# Patient Record
Sex: MALE | URBAN - METROPOLITAN AREA
[De-identification: names, ages, dates, MRNs, and addresses within clinical notes are randomized per-mention and may not be internally consistent; named-entity substitution may affect disease eponyms.]

---

## 2019-05-19 ENCOUNTER — HOSPITAL ENCOUNTER (INPATIENT)
Facility: CLINIC | Age: 39
LOS: 1 days | Discharge: HOME | DRG: 897 | End: 2019-05-20
Attending: EMERGENCY MEDICINE | Admitting: SURGERY

## 2019-05-19 ENCOUNTER — APPOINTMENT (OUTPATIENT)
Dept: GENERAL RADIOLOGY | Facility: CLINIC | Age: 39
DRG: 897 | End: 2019-05-19
Attending: EMERGENCY MEDICINE

## 2019-05-19 ENCOUNTER — APPOINTMENT (OUTPATIENT)
Dept: CT IMAGING | Facility: CLINIC | Age: 39
DRG: 897 | End: 2019-05-19
Attending: EMERGENCY MEDICINE

## 2019-05-19 DIAGNOSIS — F10.921 ALCOHOL INTOXICATION WITH DELIRIUM (H): ICD-10-CM

## 2019-05-19 DIAGNOSIS — R45.1 AGITATION: ICD-10-CM

## 2019-05-19 PROBLEM — R78.0 ELEVATED ETOH LEVEL: Status: ACTIVE | Noted: 2019-05-19

## 2019-05-19 LAB
ALBUMIN SERPL-MCNC: 3.4 G/DL (ref 3.4–5)
ALBUMIN SERPL-MCNC: 3.9 G/DL (ref 3.4–5)
ALBUMIN UR-MCNC: NEGATIVE MG/DL
ALP SERPL-CCNC: 111 U/L (ref 40–150)
ALP SERPL-CCNC: 94 U/L (ref 40–150)
ALT SERPL W P-5'-P-CCNC: 28 U/L (ref 0–70)
ALT SERPL W P-5'-P-CCNC: 29 U/L (ref 0–70)
AMPHETAMINES UR QL SCN: NEGATIVE
AMYLASE SERPL-CCNC: 45 U/L (ref 30–110)
ANION GAP SERPL CALCULATED.3IONS-SCNC: 12 MMOL/L (ref 3–14)
ANION GAP SERPL CALCULATED.3IONS-SCNC: 14 MMOL/L (ref 3–14)
APAP SERPL-MCNC: <2 MG/L (ref 10–20)
APPEARANCE UR: CLEAR
AST SERPL W P-5'-P-CCNC: 18 U/L (ref 0–45)
AST SERPL W P-5'-P-CCNC: 25 U/L (ref 0–45)
BARBITURATES UR QL: NEGATIVE
BASOPHILS # BLD AUTO: 0 10E9/L (ref 0–0.2)
BASOPHILS NFR BLD AUTO: 0 %
BENZODIAZ UR QL: NEGATIVE
BILIRUB SERPL-MCNC: 0.2 MG/DL (ref 0.2–1.3)
BILIRUB SERPL-MCNC: 0.5 MG/DL (ref 0.2–1.3)
BILIRUB UR QL STRIP: NEGATIVE
BUN SERPL-MCNC: 10 MG/DL (ref 7–30)
BUN SERPL-MCNC: 13 MG/DL (ref 7–30)
CA-I BLD-MCNC: 4.3 MG/DL (ref 4.4–5.2)
CALCIUM SERPL-MCNC: 7.7 MG/DL (ref 8.5–10.1)
CALCIUM SERPL-MCNC: 9.2 MG/DL (ref 8.5–10.1)
CANNABINOIDS UR QL SCN: NEGATIVE
CHLORIDE SERPL-SCNC: 107 MMOL/L (ref 94–109)
CHLORIDE SERPL-SCNC: 109 MMOL/L (ref 94–109)
CO2 SERPL-SCNC: 21 MMOL/L (ref 20–32)
CO2 SERPL-SCNC: 21 MMOL/L (ref 20–32)
COCAINE UR QL: POSITIVE
COLOR UR AUTO: ABNORMAL
CREAT SERPL-MCNC: 0.88 MG/DL (ref 0.66–1.25)
CREAT SERPL-MCNC: 1.21 MG/DL (ref 0.66–1.25)
DIFFERENTIAL METHOD BLD: ABNORMAL
EOSINOPHIL # BLD AUTO: 0.9 10E9/L (ref 0–0.7)
EOSINOPHIL NFR BLD AUTO: 6 %
ERYTHROCYTE [DISTWIDTH] IN BLOOD BY AUTOMATED COUNT: 13.6 % (ref 10–15)
ERYTHROCYTE [DISTWIDTH] IN BLOOD BY AUTOMATED COUNT: 13.6 % (ref 10–15)
ETHANOL SERPL-MCNC: 0.24 G/DL
GFR SERPL CREATININE-BSD FRML MDRD: 75 ML/MIN/{1.73_M2}
GFR SERPL CREATININE-BSD FRML MDRD: >90 ML/MIN/{1.73_M2}
GLUCOSE BLDC GLUCOMTR-MCNC: 108 MG/DL (ref 70–99)
GLUCOSE SERPL-MCNC: 112 MG/DL (ref 70–99)
GLUCOSE SERPL-MCNC: 115 MG/DL (ref 70–99)
GLUCOSE UR STRIP-MCNC: NEGATIVE MG/DL
HCT VFR BLD AUTO: 43.5 % (ref 40–53)
HCT VFR BLD AUTO: 47.2 % (ref 40–53)
HGB BLD-MCNC: 15.3 G/DL (ref 13.3–17.7)
HGB BLD-MCNC: 16.8 G/DL (ref 13.3–17.7)
HGB UR QL STRIP: NEGATIVE
INTERPRETATION ECG - MUSE: NORMAL
KETONES UR STRIP-MCNC: NEGATIVE MG/DL
LACTATE BLD-SCNC: 2.7 MMOL/L (ref 0.7–2)
LEUKOCYTE ESTERASE UR QL STRIP: NEGATIVE
LIPASE SERPL-CCNC: 94 U/L (ref 73–393)
LYMPHOCYTES # BLD AUTO: 7.1 10E9/L (ref 0.8–5.3)
LYMPHOCYTES NFR BLD AUTO: 47 %
MAGNESIUM SERPL-MCNC: 2.3 MG/DL (ref 1.6–2.3)
MCH RBC QN AUTO: 29.9 PG (ref 26.5–33)
MCH RBC QN AUTO: 30.5 PG (ref 26.5–33)
MCHC RBC AUTO-ENTMCNC: 35.2 G/DL (ref 31.5–36.5)
MCHC RBC AUTO-ENTMCNC: 35.6 G/DL (ref 31.5–36.5)
MCV RBC AUTO: 85 FL (ref 78–100)
MCV RBC AUTO: 86 FL (ref 78–100)
MONOCYTES # BLD AUTO: 0.5 10E9/L (ref 0–1.3)
MONOCYTES NFR BLD AUTO: 3 %
MRSA DNA SPEC QL NAA+PROBE: NEGATIVE
NEUTROPHILS # BLD AUTO: 6.7 10E9/L (ref 1.6–8.3)
NEUTROPHILS NFR BLD AUTO: 44 %
NITRATE UR QL: NEGATIVE
OPIATES UR QL SCN: NEGATIVE
PCP UR QL SCN: NEGATIVE
PH UR STRIP: 5.5 PH (ref 5–7)
PHOSPHATE SERPL-MCNC: 3.4 MG/DL (ref 2.5–4.5)
PLATELET # BLD AUTO: 291 10E9/L (ref 150–450)
PLATELET # BLD AUTO: 339 10E9/L (ref 150–450)
PLATELET # BLD EST: ABNORMAL 10*3/UL
POTASSIUM SERPL-SCNC: 3.4 MMOL/L (ref 3.4–5.3)
POTASSIUM SERPL-SCNC: 3.9 MMOL/L (ref 3.4–5.3)
PROT SERPL-MCNC: 7 G/DL (ref 6.8–8.8)
PROT SERPL-MCNC: 8.3 G/DL (ref 6.8–8.8)
RBC # BLD AUTO: 5.11 10E12/L (ref 4.4–5.9)
RBC # BLD AUTO: 5.51 10E12/L (ref 4.4–5.9)
RBC MORPH BLD: ABNORMAL
SALICYLATES SERPL-MCNC: 3 MG/DL
SODIUM SERPL-SCNC: 142 MMOL/L (ref 133–144)
SODIUM SERPL-SCNC: 142 MMOL/L (ref 133–144)
SOURCE: ABNORMAL
SP GR UR STRIP: 1 (ref 1–1.03)
SPECIMEN SOURCE: NORMAL
UROBILINOGEN UR STRIP-MCNC: NORMAL MG/DL (ref 0–2)
WBC # BLD AUTO: 11.2 10E9/L (ref 4–11)
WBC # BLD AUTO: 15.2 10E9/L (ref 4–11)

## 2019-05-19 PROCEDURE — 80320 DRUG SCREEN QUANTALCOHOLS: CPT | Performed by: EMERGENCY MEDICINE

## 2019-05-19 PROCEDURE — 40000275 ZZH STATISTIC RCP TIME EA 10 MIN

## 2019-05-19 PROCEDURE — 70450 CT HEAD/BRAIN W/O DYE: CPT

## 2019-05-19 PROCEDURE — 96365 THER/PROPH/DIAG IV INF INIT: CPT

## 2019-05-19 PROCEDURE — 25000132 ZZH RX MED GY IP 250 OP 250 PS 637: Performed by: INTERNAL MEDICINE

## 2019-05-19 PROCEDURE — 80053 COMPREHEN METABOLIC PANEL: CPT | Performed by: EMERGENCY MEDICINE

## 2019-05-19 PROCEDURE — 99292 CRITICAL CARE ADDL 30 MIN: CPT

## 2019-05-19 PROCEDURE — 25000132 ZZH RX MED GY IP 250 OP 250 PS 637: Performed by: SURGERY

## 2019-05-19 PROCEDURE — 80329 ANALGESICS NON-OPIOID 1 OR 2: CPT | Performed by: EMERGENCY MEDICINE

## 2019-05-19 PROCEDURE — 25000128 H RX IP 250 OP 636: Performed by: EMERGENCY MEDICINE

## 2019-05-19 PROCEDURE — 25000125 ZZHC RX 250: Performed by: SURGERY

## 2019-05-19 PROCEDURE — 83735 ASSAY OF MAGNESIUM: CPT | Performed by: SURGERY

## 2019-05-19 PROCEDURE — 40000986 XR CHEST PORT 1 VW

## 2019-05-19 PROCEDURE — 94002 VENT MGMT INPAT INIT DAY: CPT

## 2019-05-19 PROCEDURE — 25800030 ZZH RX IP 258 OP 636: Performed by: SURGERY

## 2019-05-19 PROCEDURE — 82150 ASSAY OF AMYLASE: CPT | Performed by: SURGERY

## 2019-05-19 PROCEDURE — 87641 MR-STAPH DNA AMP PROBE: CPT | Performed by: SURGERY

## 2019-05-19 PROCEDURE — 96375 TX/PRO/DX INJ NEW DRUG ADDON: CPT

## 2019-05-19 PROCEDURE — 31500 INSERT EMERGENCY AIRWAY: CPT

## 2019-05-19 PROCEDURE — 84100 ASSAY OF PHOSPHORUS: CPT | Performed by: SURGERY

## 2019-05-19 PROCEDURE — 36415 COLL VENOUS BLD VENIPUNCTURE: CPT | Performed by: SURGERY

## 2019-05-19 PROCEDURE — 5A1935Z RESPIRATORY VENTILATION, LESS THAN 24 CONSECUTIVE HOURS: ICD-10-PCS | Performed by: EMERGENCY MEDICINE

## 2019-05-19 PROCEDURE — 99291 CRITICAL CARE FIRST HOUR: CPT | Mod: 25

## 2019-05-19 PROCEDURE — 40000281 ZZH STATISTIC TRANSPORT TIME EA 15 MIN

## 2019-05-19 PROCEDURE — 83605 ASSAY OF LACTIC ACID: CPT | Performed by: SURGERY

## 2019-05-19 PROCEDURE — 96376 TX/PRO/DX INJ SAME DRUG ADON: CPT

## 2019-05-19 PROCEDURE — 99291 CRITICAL CARE FIRST HOUR: CPT | Performed by: SURGERY

## 2019-05-19 PROCEDURE — 00000146 ZZHCL STATISTIC GLUCOSE BY METER IP

## 2019-05-19 PROCEDURE — 85027 COMPLETE CBC AUTOMATED: CPT | Performed by: SURGERY

## 2019-05-19 PROCEDURE — 87640 STAPH A DNA AMP PROBE: CPT | Performed by: SURGERY

## 2019-05-19 PROCEDURE — 80307 DRUG TEST PRSMV CHEM ANLYZR: CPT | Performed by: EMERGENCY MEDICINE

## 2019-05-19 PROCEDURE — 83690 ASSAY OF LIPASE: CPT | Performed by: SURGERY

## 2019-05-19 PROCEDURE — 81003 URINALYSIS AUTO W/O SCOPE: CPT | Performed by: EMERGENCY MEDICINE

## 2019-05-19 PROCEDURE — 25000128 H RX IP 250 OP 636

## 2019-05-19 PROCEDURE — 85025 COMPLETE CBC W/AUTO DIFF WBC: CPT | Performed by: EMERGENCY MEDICINE

## 2019-05-19 PROCEDURE — 40000008 ZZH STATISTIC AIRWAY CARE

## 2019-05-19 PROCEDURE — 25000125 ZZHC RX 250: Performed by: EMERGENCY MEDICINE

## 2019-05-19 PROCEDURE — 20000003 ZZH R&B ICU

## 2019-05-19 PROCEDURE — 82330 ASSAY OF CALCIUM: CPT | Performed by: SURGERY

## 2019-05-19 PROCEDURE — 96366 THER/PROPH/DIAG IV INF ADDON: CPT

## 2019-05-19 PROCEDURE — 80053 COMPREHEN METABOLIC PANEL: CPT | Performed by: SURGERY

## 2019-05-19 PROCEDURE — 93005 ELECTROCARDIOGRAM TRACING: CPT

## 2019-05-19 RX ORDER — PROPOFOL 10 MG/ML
INJECTION, EMULSION INTRAVENOUS
Status: COMPLETED
Start: 2019-05-19 | End: 2019-05-19

## 2019-05-19 RX ORDER — ETOMIDATE 2 MG/ML
30 INJECTION INTRAVENOUS ONCE
Status: COMPLETED | OUTPATIENT
Start: 2019-05-19 | End: 2019-05-19

## 2019-05-19 RX ORDER — NALOXONE HYDROCHLORIDE 0.4 MG/ML
.1-.4 INJECTION, SOLUTION INTRAMUSCULAR; INTRAVENOUS; SUBCUTANEOUS
Status: DISCONTINUED | OUTPATIENT
Start: 2019-05-19 | End: 2019-05-20 | Stop reason: HOSPADM

## 2019-05-19 RX ORDER — FENTANYL CITRATE 50 UG/ML
INJECTION, SOLUTION INTRAMUSCULAR; INTRAVENOUS
Status: COMPLETED
Start: 2019-05-19 | End: 2019-05-19

## 2019-05-19 RX ORDER — CHLORHEXIDINE GLUCONATE ORAL RINSE 1.2 MG/ML
15 SOLUTION DENTAL EVERY 12 HOURS
Status: DISCONTINUED | OUTPATIENT
Start: 2019-05-19 | End: 2019-05-19

## 2019-05-19 RX ORDER — DEXMEDETOMIDINE HYDROCHLORIDE 4 UG/ML
0.2-0.7 INJECTION, SOLUTION INTRAVENOUS CONTINUOUS
Status: DISCONTINUED | OUTPATIENT
Start: 2019-05-19 | End: 2019-05-20 | Stop reason: HOSPADM

## 2019-05-19 RX ORDER — PROPOFOL 10 MG/ML
10-20 INJECTION, EMULSION INTRAVENOUS EVERY 30 MIN PRN
Status: DISCONTINUED | OUTPATIENT
Start: 2019-05-19 | End: 2019-05-19

## 2019-05-19 RX ORDER — GABAPENTIN 250 MG/5ML
1200 SOLUTION ORAL ONCE
Status: COMPLETED | OUTPATIENT
Start: 2019-05-19 | End: 2019-05-19

## 2019-05-19 RX ORDER — NALOXONE HYDROCHLORIDE 0.4 MG/ML
.1-.4 INJECTION, SOLUTION INTRAMUSCULAR; INTRAVENOUS; SUBCUTANEOUS
Status: DISCONTINUED | OUTPATIENT
Start: 2019-05-19 | End: 2019-05-19

## 2019-05-19 RX ORDER — FENTANYL CITRATE 50 UG/ML
50 INJECTION, SOLUTION INTRAMUSCULAR; INTRAVENOUS ONCE
Status: COMPLETED | OUTPATIENT
Start: 2019-05-19 | End: 2019-05-19

## 2019-05-19 RX ORDER — FENTANYL CITRATE 50 UG/ML
INJECTION, SOLUTION INTRAMUSCULAR; INTRAVENOUS
Status: DISCONTINUED
Start: 2019-05-19 | End: 2019-05-19

## 2019-05-19 RX ORDER — PROPOFOL 10 MG/ML
5-75 INJECTION, EMULSION INTRAVENOUS CONTINUOUS
Status: DISCONTINUED | OUTPATIENT
Start: 2019-05-19 | End: 2019-05-19

## 2019-05-19 RX ORDER — GABAPENTIN 250 MG/5ML
600 SOLUTION ORAL 2 TIMES DAILY
Status: DISCONTINUED | OUTPATIENT
Start: 2019-05-19 | End: 2019-05-20 | Stop reason: HOSPADM

## 2019-05-19 RX ADMIN — FENTANYL CITRATE 50 MCG: 50 INJECTION, SOLUTION INTRAMUSCULAR; INTRAVENOUS at 04:58

## 2019-05-19 RX ADMIN — FENTANYL CITRATE: 50 INJECTION, SOLUTION INTRAMUSCULAR; INTRAVENOUS at 03:59

## 2019-05-19 RX ADMIN — VALPROIC ACID 500 MG: 250 SOLUTION ORAL at 09:04

## 2019-05-19 RX ADMIN — PROPOFOL 5 MCG/KG/MIN: 10 INJECTION, EMULSION INTRAVENOUS at 03:52

## 2019-05-19 RX ADMIN — DEXMEDETOMIDINE 0.2 MCG/KG/HR: 100 INJECTION, SOLUTION, CONCENTRATE INTRAVENOUS at 09:04

## 2019-05-19 RX ADMIN — GABAPENTIN 1200 MG: 250 SUSPENSION ORAL at 12:22

## 2019-05-19 RX ADMIN — FENTANYL CITRATE 50 MCG: 50 INJECTION, SOLUTION INTRAMUSCULAR; INTRAVENOUS at 03:51

## 2019-05-19 RX ADMIN — PROPOFOL 40 MCG/KG/MIN: 10 INJECTION, EMULSION INTRAVENOUS at 07:28

## 2019-05-19 RX ADMIN — CHLORHEXIDINE GLUCONATE 15 ML: 1.2 RINSE ORAL at 07:23

## 2019-05-19 RX ADMIN — ROCURONIUM BROMIDE 75 MG: 10 INJECTION, SOLUTION INTRAVENOUS at 03:51

## 2019-05-19 RX ADMIN — ETOMIDATE 30 MG: 2 INJECTION, SOLUTION INTRAVENOUS at 03:48

## 2019-05-19 ASSESSMENT — ACTIVITIES OF DAILY LIVING (ADL)
SWALLOWING: 0-->SWALLOWS FOODS/LIQUIDS WITHOUT DIFFICULTY
ADLS_ACUITY_SCORE: 15
COGNITION: 0 - NO COGNITION ISSUES REPORTED
RETIRED_EATING: 0-->INDEPENDENT
ADLS_ACUITY_SCORE: 20
AMBULATION: 0-->INDEPENDENT
TRANSFERRING: 0-->INDEPENDENT
ADLS_ACUITY_SCORE: 15
BATHING: 0-->INDEPENDENT
DRESS: 0-->INDEPENDENT
TOILETING: 0-->INDEPENDENT
ADLS_ACUITY_SCORE: 20
NUMBER_OF_TIMES_PATIENT_HAS_FALLEN_WITHIN_LAST_SIX_MONTHS: 0
RETIRED_COMMUNICATION: 0-->UNDERSTANDS/COMMUNICATES WITHOUT DIFFICULTY
FALL_HISTORY_WITHIN_LAST_SIX_MONTHS: NO

## 2019-05-19 NOTE — PROGRESS NOTES
[place] - Suicide Severity Rating Scale Completed? No, secondary to mechanical ventilation and pt unresponsiveness/sedation  If yes, what color did the patient score? N/A

## 2019-05-19 NOTE — ED NOTES
Pt continued with snoring respirations and large amounts of secretions; SPO2 88-90% on 10L NRB with nasal airway. Pt intubated with 30mg Etomidate IV, 75mg Rocuronium IV; 8.0 ETT 26@ lip by Dr. Cox.

## 2019-05-19 NOTE — PROGRESS NOTES
"ICU Staff:    Deonna John is a 38 year old man admitted to the ICU for vent support required due to ETOH overdose and possible other overdose; cocaine present of the drug screen. The patient required intubation in the [facility] ED. The patient was found to have an altered mental state and he was found outside supine on the ground outside of [facility]. The patient was \"very tense and combative\" en route patient received 400 mcg Ketamine.     MEDICATIONS:  Current Facility-Administered Medications   Medication     chlorhexidine (PERIDEX) 0.12 % solution 15 mL     dexmedetomidine (PRECEDEX) 400 mcg in 0.9% sodium chloride 100 mL     naloxone (NARCAN) injection 0.1-0.4 mg     naloxone (NARCAN) injection 0.1-0.4 mg     propofol (DIPRIVAN) infusion    And     propofol (DIPRIVAN) infusion 10-20 mg     propofol (DIPRIVAN) infusion     propofol (DIPRIVAN) infusion     ALLERGIES:  No Known Allergies     SOCIAL HISTORY:  Social History     Socioeconomic History     Marital status: Not on file     Spouse name: Not on file     Number of children: Not on file     Years of education: Not on file     Highest education level: Not on file   Occupational History     Not on file   Social Needs     Financial resource strain: Not on file     Food insecurity:     Worry: Not on file     Inability: Not on file     Transportation needs:     Medical: Not on file     Non-medical: Not on file   Tobacco Use     Smoking status: Not on file   Substance and Sexual Activity     Alcohol use: Not on file     Drug use: Not on file     Sexual activity: Not on file   Lifestyle     Physical activity:     Days per week: Not on file     Minutes per session: Not on file     Stress: Not on file   Relationships     Social connections:     Talks on phone: Not on file     Gets together: Not on file     Attends [employer] service: Not on file     Active member of club or organization: Not on file     Attends meetings of clubs or organizations: " Not on file     Relationship status: Not on file     Intimate partner violence:     Fear of current or ex partner: Not on file     Emotionally abused: Not on file     Physically abused: Not on file     Forced sexual activity: Not on file   Other Topics Concern     Not on file   Social History Narrative     Not on file      PHYSICAL EXAM:  Vital Signs: /75   Pulse 84   Temp 96.4  F (35.8  C)   Resp 15   Wt 75 kg (165 lb 5.5 oz)   SpO2 99%   HEENT: pupils 2 mm bilaterally and reactive.   Chest: CTA bilaterally,   Cor: RRR no murmur   Abd: soft and no masses   EXT: warm and well perfused. No edema.    Imaging:    CT Head: normal.    CXR: ET tube is 3 cm above the pepe    A/P: The patient is critically ill due to an overdose. Will continue to support with full vent and sedation. Will provide Precedex which may permit extubation on Precedex.     Neuro: ETOH overdose will follow the Neuro exam and extubate when possible. Will sedate with Propofol and Precedex to permit immediate sedation and then wean off the propofol to extubate on Precedex.    Cardiac: no acut ei issues. Will keep on the monitor and maintain electrolytes.   Pulm: no acute process. Will keep on the sat monitor and maintain full vent support and will check the ABG.   GI: NPO for now. OG suction.   : Ureña catheter to check for adequate UO.   ID: no acute issues.   ENDO: no acute issues.     The patient is critically ill due to ETOH overdose and require full vent support. 35 min of Critical Care time to examine the patient to review the medical records and to coordinate e the patient's ICU care.     Alvaro Pedersen MD, PhD

## 2019-05-19 NOTE — PROGRESS NOTES
ICU note    Patient admitted earlier this morning.  Tolerated propofol decrease from an agitation standpoint he did have some dyssynchrony with vent and gagging behavioral issues.    /72   Pulse 87   Temp 97  F (36.1  C)   Resp 17   Wt 75 kg (165 lb 5.5 oz)   SpO2 98%   Pupils equal round reactive  Chest clear  Cardiovascular exam without murmurs or gallops.  Good peripheral pulses.  Abdomen soft  He is sedated, nonfocal neuro exam without abnormal movements or tremor    As his alcohol wears off we will assess for extubation.    Plan  Start Precedex.  Wean propofol off.  Valproic acid and gabapentin given    Wean sedation and extubate.  Given that he did not have a primary respiratory diagnosis or any respiratory complications he can most likely be extubated without a pressure support trial.    30 minutes of critical care time thus far today.  This is in addition to previously documented 35 minutes.  This brings his total for today to 65 minutes of critical care time.    Marco Burgess MD  ICU

## 2019-05-19 NOTE — PROGRESS NOTES
Patient self extubated at 0935. Patient is on a 6L oxymask with SpO2 in the upper 90's. No stridor present.  Will cont to monitor.  5/19/2019  Nisa Phelps RRT

## 2019-05-19 NOTE — PLAN OF CARE
"Patient improving. Extubated self. Doing well on RA. Confused to situation of what happened overnight and how he arrived in the hospital/ICU. Patient was demanding to go home and have IVs taken out. Dr. Walter stated he would see how the afternoon went, and if able to walk around the unit and tolerate a meal, then he could be discharged. However, patient was unable to get up and has been sleeping most of the afternoon.  The hope is that the patient will stay overnight and discharge in the AM.    Neuro: Lethargic. Opens eyes spontaneously. Follows commands. Tearful about situation. Refused to allow providers to contact family or friends to update on hospitilization (no contact in Epic). Patient reported that \"no one cares about me, and they probably wish I was dead anyways.\"      CV: SR/ST. Good BP. Good pulses.     Lungs: RA. Diminished/clear.    GI: Regular diet-patient did not eat.    : montalvo with good UO.    Skin: Intact.    PIV x2. SL.  "

## 2019-05-19 NOTE — ED TRIAGE NOTES
"Pt was found on sidewalk at the [facility] where he was estimated to have been lying for approx 30 min. Pt was screaming \"I want to die\" and \"I want to pee\" at EMS and PD. Pt very aggressive with EMS, given 400mg IM Ketamine. Pt sedated and not rousing to verbal or tactile stimulation upon arrival to ED, snoring respirations, large amounts of clear, thick secretions.   "

## 2019-05-19 NOTE — PLAN OF CARE
Pt admitted to ICU w/AMS 2/2 intoxication. Remains intubated 2/2 respiratory suppression post ketamine administration, and sedated on propofol. VSS. PERRL. Unresponsive at this time to stimuli. Will await for pt to awake and undergo SBT. Precedex also available if needed during extubation trial. Will continue to monitor.

## 2019-05-19 NOTE — ED NOTES
Bed: [facility]  Expected date: 5/19/19  Expected time: 3:24 AM  Means of arrival: Ambulance  Comments:  Veronica 535 38M delirious; ketamine

## 2019-05-19 NOTE — PROGRESS NOTES
Patient self extubated after propofol sedation was turned off, per request of MD. Patient was restrained and on Precedex. Patient began fighting the vent, arouse and became agitated to the point of the MD and bedside RN attempting to hold arms and hands away from vent. Patient placed on 6L Oxymask. No stridor and O2Sats 100%.

## 2019-05-19 NOTE — ED NOTES
No need for Health Officer Hold in ED, per MD; pt was transported to ED on PO via [facility].

## 2019-05-19 NOTE — ED PROVIDER NOTES
"  History     Chief Complaint:  Altered Mental Status    The history is provided by the EMS personnel. The history is limited by the condition of the patient.      Deonna John is a 38 year old male who presents via EMS with altered mental state. Patient was found outside supine on the ground outside of [facility]; medics report it is unknown how long he was outside. Patient was screaming \"I want to die and I need to pee\" over and over again once PD approached. En route, patient was secreting significantly and was very tense and combative. Patient hypertensive at 199/129 with HR at 130 bpm sating at 82% after 400 mcg of Ketamine was given. This increased to 90% on 3L.    Allergies:  No Known Drug Allergies    Medications:    Medications reviewed. No current medications.     Past Medical History:    Medical history reviewed. No pertinent medical history.    Past Surgical History:    Surgical history reviewed. No pertinent surgical history.    Family History:    Family history reviewed. No pertinent family history.     Social History:  Presents via EMS      Review of Systems   Unable to perform ROS: Mental status change     Physical Exam   Patient Vitals for the past 24 hrs:   BP Heart Rate Resp SpO2 Weight   05/19/19 0332 112/74 125 8 90 % 74.4 kg (164 lb 0.4 oz)     Physical Exam  Head: No signs of trauma.   Mouth/Throat: Oropharynx with very thick secretions   Eyes: Conjunctivae are normal.   Neck: Normal range of motion. No cervical adenopathy  CV: tachycardic and regular rhythm.    Resp: Coarse upper airway sounds.  GI: Soft. There is no apparent tenderness.  No rebound or guarding.  Normal bowel sounds.    MSK: Normal range of motion. No bony deformities.    Neuro: The patient is non responsive to verbal or painful stimuli   Skin: Skin is cool throughout.        Emergency Department Course     ECG:  ECG taken at 0417, ECG read at 0419  Normal sinus rhythm  Normal ECG  Rate 83 bpm. KS interval 168 ms. QRS " "duration 84 ms. QT/QTc 380/446 ms. P-R-T axes 76 83 50.    Imaging:  Head CT w/o contrast  No acute abnormality.    LUIS FERNANDO SCHMIDT MD  Reading per radiology    Chest  XR, 1 view PORTABLE  ET tube 3.5 cm above the pepe.  Reading per radiology    Laboratory:  CBC: WBC 15.2 (H), HGB 16.8,   CMP: glucose 115 (H) o/w WNL (Creatinine 1.21)  Salicylate level: 3  Acetaminophen level: <2  UA: specific gravity 1.002 (L) o/w WNL  UDS: Cocaine positive o/w negative  PARAMJIT: 0.24 (H)    Procedures:  [facility] Intubation Procedure Note   Date/Time: 3:33 AM  Performed by: Lennox Cox MD    The procedure was performed in an emergent situation.  Patient identity confirmed: verbally with patient and arm band  Time out: Immediately prior to procedure a \"time out\" was called to verify the correct patient, procedure, equipment, support staff and site/side marked as required.    Indication: Airway protection    [facility]  Patient status: Unconscious  Preoxygenation: Mask  Pretreatment medications: None  Sedatives: Etomidate  Paralytic: 75 mg rocuronium  Laryngoscope size: Mac 4  Tube size: 8.0 cuffed with cuff inflated after placement  Number of attempts: 1  Cricoid pressure: yes  Cords visualized: yes  Post-procedure assessment: Breath sounds equal bilaterally with chest rise and absent over the epigastrium, Chest x-ray interpreted by me demonstrating endotracheal tube in appropriate position and CO2 detector.  Tube secured with: ETT blair    Patient tolerated the procedure well with no immediate complications.  Complications:  None    Interventions:  0346: Fentanyl 50 mcg IV  0348: Etomidate 30 mg IV  0351: Rocuronium 75 mg IV  0351: Fentanyl 50 mcg IV  0352: Propofol 5 mcg/kg/min IV   0359: Fentanyl 50 mcg IV  0416: Rate/Dose change: Propofol 20 mcg/kg/min IV   0449: Rate/Dose change: Propofol 40 mcg/kg/min IV   0458: Rate/Dose change: Propofol 50 mcg/kg/min IV     Emergency Department " Course:  0327 Nursing notes and vitals reviewed. I performed an exam of the patient as documented above. Plan for intubation. RT at bedside.   0335 I intubated the patient as detailed above.   0344 XR at bedside.  0412 The patient was sent for a CT while in the emergency department, results above.   0417 EKG obtained in the ED, see results above.   0509 I spoke with Dr. Pedersen of ICU regarding patient's presentation, findings, and plan of care who accepted admission.     Impression & Plan      Medical Decision Making:  Deonna John is a 38 year old male who presents to the emergency department today for evaluation of agitation.  Apparently he was found minimally responsive by the [facility] but then with stimulation became quite agitated and somewhat violent requiring restraints.  Patient ultimately received ketamine with EMS.  In presentation to the ER, he did not respond to verbal or painful stimuli.  He had very thick secretions and some coarse upper airway sounds.  Given the patient's secretions and his reported significant agitation and potential danger to himself or others in that state, it was decided to intubate the patient for airway protection.  This was accomplished without difficulty.  Work-up was pursued which showed an elevated alcohol level along with positive cocaine on drug screen.  The remainder of his work-up was non-concerning.  Patient was admitted to the intensive care unit with the plan for continued monitoring as he metabolizes the alcohol and further assessment.    Diagnosis:    ICD-10-CM   1. Alcohol intoxication with delirium (H) F10.921   2. Agitation R45.1     Disposition: ICU    Critical Care time was 30 minutes for this patient excluding procedures.     Scribe Disclosure:  Jayy COOPER, am serving as a scribe at 3:30 AM on 5/19/2019 to document services personally performed by Lennox Cox MD based on my observations and the provider's statements to me.     [place]G[place]  [facility] [facility] [place]  05/19/19 0706

## 2019-05-19 NOTE — ED NOTES
Pt to CT and back with 2 RNs and RT on cardiac monitor. Pt tolerated well. Placed under barney hugger.

## 2019-05-20 VITALS
DIASTOLIC BLOOD PRESSURE: 75 MMHG | HEART RATE: 78 BPM | WEIGHT: 166.01 LBS | SYSTOLIC BLOOD PRESSURE: 123 MMHG | TEMPERATURE: 98.6 F | RESPIRATION RATE: 37 BRPM | OXYGEN SATURATION: 97 %

## 2019-05-20 PROCEDURE — 99239 HOSP IP/OBS DSCHRG MGMT >30: CPT | Performed by: NURSE PRACTITIONER

## 2019-05-20 ASSESSMENT — ACTIVITIES OF DAILY LIVING (ADL)
ADLS_ACUITY_SCORE: 15

## 2019-05-20 NOTE — PROGRESS NOTES
Pt disoriented to situation. VSS on RA. Sats high 90s. Slept overnight/lethargic, awake early this am and asking when will be going home. Pt hopeful to go home, before the job interview at 12 today. Adequate urine output per montalvo. Will cont to closely monitor.

## 2019-05-20 NOTE — DISCHARGE SUMMARY
Pt's katia and Richard are dc'd and pt has walked in [place] w stand by assist.  Discharged via cab w volunteer walking him down to cab.  Pt has phone w hium and clothing that wasn't cut up in ED.  See discharge instructions.

## 2019-05-20 NOTE — DISCHARGE SUMMARY
[facility]  Discharge Summary      Date of Admission: 5/19/2019  Date of Discharge: 5/20/2019  Discharging Provider: Iliana Torres NP  Date of Service (when I saw the patient): 5/20/19    Primary Provider: No primary care provider on file  Primary Care clinic: No primary provider on file  Phone: None  Fax number: None     Discharge Diagnoses   Alcohol intoxication  Drug screen positive for cocaine    Hospital Course   Mr. John is a 38 year old male admitted to the hospital early morning on 5/19/19 after being found on the ground outside with altered mental status. EMS reported it was unclear how long he was outside, he was combative, and tense. In the ED he had snoring respirations with large amounts of secretions and oxygen saturations in the high 80s. He was intubated and admitted to the IDU. His BAL was 0.24 and urine drug screen positive for cocaine.     His mental status improved and he extubated himself late morning on 5/19/19. He was quickly weaned to room air and allowed to eat and ambulate. The morning of 5/20/19 he says he does not remember the events leading to his hospital presentation, and he would like to discharge home. He denies any thoughts of harming himself. He was discharged home via cab. He declined the offer to contact family to notify them of his admission.    Code Status   Full Code    SUBJECTIVE: No complaints this morning. Denies pain, chest pain, shortness of breath, numbness/tingling.     General: Awake, alert  HEENT: Mucous membranes moist  Pulm: Clear to auscultation, no work of breathing  Cardiac: S1/S2, no m/r/g; pedal pulses 2+, no pedal edema  GI: Bowel sounds present, abdomen soft and nontender  : Urine clear and yellow  Extremities: No deformities  Skin: No rashes, lesions noted    Discharge Disposition   Discharged to home  Condition at discharge: Good  Discharge VS: Blood pressure 126/80, pulse 84, temperature 98.6  F (37  C), temperature source Bladder,  resp. rate 18, weight 75.3 kg (166 lb 0.1 oz), SpO2 97 %.    Consultations This Hospital Stay   None    Discharge Orders      Reason for your hospital stay    Found lying on the ground, alcohol and cocaine in your system.     Follow-up and recommended labs and tests     Follow up with primary care provider within 7 days for hospital follow-up.  No follow up labs or test are needed.     Activity    Your activity upon discharge: activity as tolerated     Diet    Follow this diet upon discharge: Regular Diet Adult     Discharge Medications   There are no discharge medications for this patient.    Allergies   No Known Allergies  Data   Most Recent 3 CBC's:  Recent Labs   Lab Test 05/19/19  0639 05/19/19  0338   WBC 11.2* 15.2*   HGB 15.3 16.8   MCV 85 86    339      Most Recent 3 BMP's:  Recent Labs   Lab Test 05/19/19  0639 05/19/19  0338    142   POTASSIUM 3.9 3.4   CHLORIDE 109 107   CO2 21 21   BUN 10 13   CR 0.88 1.21   ANIONGAP 12 14   EDWARD 7.7* 9.2   * 115*     Most Recent 2 LFT's:  Recent Labs   Lab Test 05/19/19  0639 05/19/19  0338   AST 25 18   ALT 28 29   ALKPHOS 94 111   BILITOTAL 0.5 0.2     Results for orders placed or performed during the hospital encounter of 05/19/19   Chest  XR, 1 view PORTABLE    Narrative    XR CHEST PORTABLE 1 VIEW   5/19/2019 3:52 AM     HISTORY: Post intubation.    COMPARISON: None.    FINDINGS: Supine portable chest. ET tube approximately 3.5 cm above  the pepe. No pneumothorax. The heart size is normal. The lungs are  clear.      Impression    IMPRESSION: ET tube 3.5 cm above the pepe.    LUIS FERNANDO SCHMIDT MD   Head CT w/o contrast    Narrative    CT SCAN OF THE HEAD WITHOUT CONTRAST   5/19/2019 4:13 AM     HISTORY: Altered mental status.    TECHNIQUE:  Axial images of the head and coronal reformations without  IV contrast material. Radiation dose for this scan was reduced using  automated exposure control, adjustment of the mA and/or kV according  to  patient size, or iterative reconstruction technique.    COMPARISON: None.    FINDINGS:  The ventricles are normal in size, shape and configuration.   The brain parenchyma and subarachnoid spaces are normal. There is no  evidence of intracranial hemorrhage, mass, acute infarct or anomaly.     The visualized portions of the sinuses and mastoids appear normal.  There is no evidence of trauma.      Impression    IMPRESSION: No acute abnormality.      LUIS FERNANDO SCHMIDT MD       Time Spent on this Encounter   I, Iliana Torres, personally saw the patient today and spent greater than 30 minutes discharging this patient.    We appreciate the opportunity to care for your patient while in the hospital.  Should you have any questions about their injuries or this discharge summary our contact information is below.    Trauma Services  [facility]   Department of Critical Care and Acute Care Surgery  [address] [address]  Office: [phone]

## 2025-03-17 ENCOUNTER — MEDICAL CORRESPONDENCE (OUTPATIENT)
Dept: HEALTH INFORMATION MANAGEMENT | Facility: CLINIC | Age: 45
End: 2025-03-17
Payer: COMMERCIAL

## 2025-03-21 ENCOUNTER — ANCILLARY PROCEDURE (OUTPATIENT)
Dept: GENERAL RADIOLOGY | Facility: CLINIC | Age: 45
End: 2025-03-21
Attending: NURSE PRACTITIONER
Payer: COMMERCIAL

## 2025-03-21 DIAGNOSIS — J18.9 PNEUMONIA: ICD-10-CM

## 2025-03-21 PROCEDURE — 71046 X-RAY EXAM CHEST 2 VIEWS: CPT | Mod: TC | Performed by: RADIOLOGY
